# Patient Record
Sex: MALE | Race: WHITE | NOT HISPANIC OR LATINO | Employment: FULL TIME | URBAN - METROPOLITAN AREA
[De-identification: names, ages, dates, MRNs, and addresses within clinical notes are randomized per-mention and may not be internally consistent; named-entity substitution may affect disease eponyms.]

---

## 2017-12-28 ENCOUNTER — ALLSCRIPTS OFFICE VISIT (OUTPATIENT)
Dept: OTHER | Facility: OTHER | Age: 42
End: 2017-12-28

## 2017-12-28 LAB — S PYO AG THROAT QL: NEGATIVE

## 2018-01-22 VITALS
DIASTOLIC BLOOD PRESSURE: 70 MMHG | HEART RATE: 72 BPM | RESPIRATION RATE: 14 BRPM | WEIGHT: 187 LBS | HEIGHT: 69 IN | BODY MASS INDEX: 27.7 KG/M2 | SYSTOLIC BLOOD PRESSURE: 140 MMHG | TEMPERATURE: 100.4 F

## 2019-01-03 ENCOUNTER — OFFICE VISIT (OUTPATIENT)
Dept: FAMILY MEDICINE CLINIC | Facility: CLINIC | Age: 44
End: 2019-01-03
Payer: COMMERCIAL

## 2019-01-03 VITALS
DIASTOLIC BLOOD PRESSURE: 82 MMHG | HEART RATE: 70 BPM | HEIGHT: 70 IN | RESPIRATION RATE: 12 BRPM | SYSTOLIC BLOOD PRESSURE: 148 MMHG | BODY MASS INDEX: 29.35 KG/M2 | TEMPERATURE: 98.9 F | WEIGHT: 205 LBS

## 2019-01-03 DIAGNOSIS — B02.9 HERPES ZOSTER WITHOUT COMPLICATION: Primary | ICD-10-CM

## 2019-01-03 PROCEDURE — 99213 OFFICE O/P EST LOW 20 MIN: CPT | Performed by: NURSE PRACTITIONER

## 2019-01-03 PROCEDURE — 3008F BODY MASS INDEX DOCD: CPT | Performed by: NURSE PRACTITIONER

## 2019-01-03 NOTE — PROGRESS NOTES
Chief Complaint   Patient presents with    Rash        Patient ID: Davy Thomas is a 41 y o  male  Patient reports noticing some pain along the thoracic spine area on the left side about mid thoracic the started over the weekend  He did do a lot of work chopping wood on Friday and thought maybe he strained a muscle  As time went on he noticed a rash erupting in the area  He states he is concerned he might have shingles  Patient states he did have chickenpox as a child  Treated with calamine lotion at home without improvement  He states it seems that the rash stopped spreading as of yesterday  Patient states there is only mild discomfort in the area of the rash  He states since onset the pain has actually lessened  No past medical history on file  No past surgical history on file  There is no problem list on file for this patient  Family History   Problem Relation Age of Onset    No Known Problems Mother     No Known Problems Father        There is no immunization history for the selected administration types on file for this patient  No Known Allergies    No current outpatient prescriptions on file  No current facility-administered medications for this visit  Social History     Social History    Marital status: /Civil Union     Spouse name: N/A    Number of children: N/A    Years of education: N/A     Social History Main Topics    Smoking status: Never Smoker    Smokeless tobacco: Never Used    Alcohol use None      Comment: Occasional     Drug use: No    Sexual activity: Not Asked     Other Topics Concern    None     Social History Narrative    Cigar smoker    Daily caffeinated coffee consumption        Review of Systems   Skin: Positive for rash           Objective:    /82 (BP Location: Left arm, Patient Position: Sitting, Cuff Size: Extra-Large)   Pulse 70   Temp 98 9 °F (37 2 °C) (Temporal)   Resp 12   Ht 5' 10" (1 778 m)   Wt 93 kg (205 lb) BMI 29 41 kg/m²        Physical Exam   Skin:   There is a erythematous vesicular rash that extends from the center of about the eighth thoracic level around that common dermatome just below the nipple to the mid of the sternum  Assessment/Plan:    No problem-specific Assessment & Plan notes found for this encounter  Diagnoses and all orders for this visit:    Herpes zoster without complication  Comments:  No current issue with uncontrolled neurologist   Erling Plume to keep the rash covered until it tries completely  Return to the office if having neurologist symptom        Recommend over-the-counter ibuprofen as needed  Return if having any post herpetic neuralgia pain  There are no Patient Instructions on file for this visit                    Joanna Huber

## 2020-08-09 ENCOUNTER — OFFICE VISIT (OUTPATIENT)
Dept: URGENT CARE | Facility: CLINIC | Age: 45
End: 2020-08-09
Payer: COMMERCIAL

## 2020-08-09 VITALS
SYSTOLIC BLOOD PRESSURE: 160 MMHG | DIASTOLIC BLOOD PRESSURE: 108 MMHG | RESPIRATION RATE: 18 BRPM | HEIGHT: 69 IN | BODY MASS INDEX: 33.62 KG/M2 | WEIGHT: 227 LBS | OXYGEN SATURATION: 99 % | TEMPERATURE: 99.7 F | HEART RATE: 105 BPM

## 2020-08-09 DIAGNOSIS — S61.219A LACERATION WITHOUT FOREIGN BODY OF UNSPECIFIED FINGER WITHOUT DAMAGE TO NAIL, INITIAL ENCOUNTER: Primary | ICD-10-CM

## 2020-08-09 PROCEDURE — 99213 OFFICE O/P EST LOW 20 MIN: CPT | Performed by: NURSE PRACTITIONER

## 2020-08-09 PROCEDURE — 3008F BODY MASS INDEX DOCD: CPT | Performed by: NURSE PRACTITIONER

## 2020-08-09 PROCEDURE — 12020 TX SUPFC WND DEHSN SMPL CLSR: CPT | Performed by: NURSE PRACTITIONER

## 2020-08-09 PROCEDURE — 12001 RPR S/N/AX/GEN/TRNK 2.5CM/<: CPT | Performed by: NURSE PRACTITIONER

## 2020-08-09 PROCEDURE — 1036F TOBACCO NON-USER: CPT | Performed by: NURSE PRACTITIONER

## 2020-08-09 RX ORDER — MULTIVITAMIN
1 CAPSULE ORAL DAILY
COMMUNITY

## 2020-08-09 RX ORDER — OMEGA-3 FATTY ACIDS CAP DELAYED RELEASE 1000 MG 1000 MG
CAPSULE DELAYED RELEASE ORAL DAILY
COMMUNITY

## 2020-08-09 RX ORDER — MAG HYDROX/ALUMINUM HYD/SIMETH 400-400-40
SUSPENSION, ORAL (FINAL DOSE FORM) ORAL DAILY
COMMUNITY

## 2020-08-09 NOTE — PATIENT INSTRUCTIONS
Keep area wrapped when out side  Open to air when at home  Tylenol/Ibuprofen as needed  Wear splint for protection  Practice gentle range of motion with finger  Monitor for signs of infection  Suture removal in 7-10 days

## 2022-10-07 ENCOUNTER — OFFICE VISIT (OUTPATIENT)
Dept: URGENT CARE | Facility: CLINIC | Age: 47
End: 2022-10-07
Payer: COMMERCIAL

## 2022-10-07 VITALS
WEIGHT: 236 LBS | HEIGHT: 70 IN | SYSTOLIC BLOOD PRESSURE: 160 MMHG | OXYGEN SATURATION: 99 % | HEART RATE: 118 BPM | BODY MASS INDEX: 33.79 KG/M2 | DIASTOLIC BLOOD PRESSURE: 100 MMHG | RESPIRATION RATE: 18 BRPM | TEMPERATURE: 97.3 F

## 2022-10-07 DIAGNOSIS — R42 DIZZINESS AND GIDDINESS: Primary | ICD-10-CM

## 2022-10-07 PROCEDURE — 99213 OFFICE O/P EST LOW 20 MIN: CPT | Performed by: PHYSICIAN ASSISTANT

## 2022-10-07 NOTE — PROGRESS NOTES
[facility]        NAME: Javi Sherman is a 42 y o  male  : 1975    MRN: [id number]  DATE: 2022  TIME: 11:28 AM    Assessment and Plan   Dizziness and giddiness [R42]  1  Dizziness and giddiness  Ambulatory Referral to Physical Therapy         Patient Instructions   Dizziness:   -Patient has nystagmus when he lays flat and with positional changes  [place]-Hallpike maneuver was attempted  We discussed that this is likely BPPV  He did have a L sided cerumen impaction which was cleared with irrigation  Otherwise Neuro exam and cardiac exam were normal  Mildly elevated HR and BP likely due to anxiety  Stay well hydrated  Rest  Follow up with ENT vs PT for possible treatment of BPPV  If you experience persistent or worsening symptoms go to the ED immediately  This was discussed in depth  Follow up with PCP in 3-5 days  Proceed to  ER if symptoms worsen  Chief Complaint     Chief Complaint   Patient presents with    Dizziness     Pt reports of dizziness started last night after laying down  Pt denies any CP or SOB  No facial droop seen  Pt reports of tingling on the right hand  History of Present Illness       The patient is a 44-year-old male who presents today for dizziness that began last night while lying in bed  He states that he rolled over in bed and felt the sensation that the room was spinning  He states that he was able to fall back asleep  He woke up this morning with continued to experience dizziness  He states that the dizziness would last 2-5 seconds and was precipitated by movement  He denies fever, chills, body aches  No sycnope or LOC  No chest pain, palpitations, dyspnea, chest tightness  No nausea, vomiting, abdominal pain  No blood in his stool  No headache, visual changes, photophobia  No facial drooping, change in gait or mentation  He states that his son was ill with COVID last week  His wife and daughter are ill with URI sx  He denies URI sx  Review of Systems   Review of Systems   Constitutional: Negative for activity change, appetite change, chills, diaphoresis, fatigue and fever  HENT: Negative for congestion, ear discharge, ear pain, facial swelling, hearing loss, postnasal drip, rhinorrhea, sinus pressure, sinus pain, sore throat, tinnitus, trouble swallowing and voice change  Eyes: Negative for photophobia and visual disturbance  Respiratory: Negative for apnea, cough, chest tightness, shortness of breath, wheezing and stridor  Cardiovascular: Negative for chest pain, palpitations and leg swelling  Gastrointestinal: Negative for abdominal distention and abdominal pain  Genitourinary: Negative for decreased urine volume  Musculoskeletal: Negative for arthralgias, joint swelling, myalgias, neck pain and neck stiffness  Skin: Negative for rash  Allergic/Immunologic: Negative for immunocompromised state  Neurological: Positive for dizziness  Negative for weakness, light-headedness, numbness and headaches  Hematological: Negative for adenopathy           Current Medications       Current Outpatient Medications:     Cholecalciferol (Vitamin D3) 125 MCG (5000 UT) CAPS, Take by mouth daily, Disp: , Rfl:     Multiple Vitamin (multivitamin) capsule, Take 1 capsule by mouth daily, Disp: , Rfl:     Omega-3 Fatty Acids (Fish Oil) 1000 MG CPDR, Take by mouth daily, Disp: , Rfl:     Current Allergies     Allergies as of 10/07/2022    (No Known Allergies)            The following portions of the patient's history were reviewed and updated as appropriate: allergies, current medications, past family history, past medical history, past social history, past surgical history and problem list      Past Medical History:   Diagnosis Date    Allergic rhinitis     Pneumonia        Past Surgical History:   Procedure Laterality Date    NO PAST SURGERIES         Family History   Problem Relation Age of Onset    No Known Problems Mother    Kathy Cao No Known Problems Father          Medications have been verified  Objective   /100   Pulse (!) 118   Temp (!) 97 3 °F (36 3 °C)   Resp 18   Ht 5' 10" (1 778 m)   Wt 107 kg (236 lb)   SpO2 99%   BMI 33 86 kg/m²   No LMP for male patient  Physical Exam     Physical Exam  Vitals and nursing note reviewed  Constitutional:       General: He is not in acute distress  Appearance: He is well-developed  He is not diaphoretic  HENT:      Head: Normocephalic and atraumatic  Right Ear: Hearing, tympanic membrane, ear canal and external ear normal       Left Ear: Hearing, tympanic membrane, ear canal and external ear normal       Nose: Nose normal  No mucosal edema or rhinorrhea  Right Sinus: No maxillary sinus tenderness or frontal sinus tenderness  Left Sinus: No maxillary sinus tenderness or frontal sinus tenderness  Mouth/Throat:      Pharynx: Uvula midline  No oropharyngeal exudate, posterior oropharyngeal erythema or uvula swelling  Tonsils: No tonsillar abscesses  Cardiovascular:      Rate and Rhythm: Normal rate and regular rhythm  Heart sounds: S1 normal and S2 normal  Heart sounds not distant  No murmur heard  No friction rub  No gallop  No S3 or S4 sounds  Pulmonary:      Effort: No tachypnea, bradypnea, accessory muscle usage or respiratory distress  Breath sounds: No decreased breath sounds, wheezing, rhonchi or rales  Musculoskeletal:      Cervical back: Normal range of motion and neck supple  Lymphadenopathy:      Cervical: No cervical adenopathy  Neurological:      General: No focal deficit present  Mental Status: He is alert and oriented to person, place, and time  Cranial Nerves: Cranial nerves are intact  No facial asymmetry  Sensory: Sensation is intact  Motor: Motor function is intact  No pronator drift  Coordination: Coordination is intact  Romberg sign negative   Finger-Nose-Finger Test normal  Gait: Gait is intact  Gait normal       Deep Tendon Reflexes: Reflexes are normal and symmetric     Psychiatric:         Behavior: Behavior normal

## 2022-10-07 NOTE — PATIENT INSTRUCTIONS
Dizziness   WHAT YOU NEED TO KNOW:   Dizziness is a feeling of being off balance or unsteady  Common causes of dizziness are an inner ear fluid imbalance or a lack of oxygen in your blood  Dizziness may be acute (lasts 3 days or less) or chronic (lasts longer than 3 days)  You may have dizzy spells that last from seconds to a few hours  DISCHARGE INSTRUCTIONS:   Return to the emergency department if:   You have a headache and a stiff neck  You have shaking chills and a fever  You vomit over and over with no relief  Your vomit or bowel movements are red or black  You have pain in your chest, back, or abdomen  You have numbness, especially in your face, arms, or legs  You have trouble moving your arms or legs  You are confused  Contact your healthcare provider if:   You have a fever  Your symptoms do not get better with treatment  You have questions or concerns about your condition or care  Manage your symptoms:   Do not drive  or operate heavy machinery when you are dizzy  Get up slowly  from sitting or lying down  Drink plenty of liquids  Liquids help prevent dehydration  Ask how much liquid to drink each day and which liquids are best for you  Follow up with your doctor as directed:  Write down your questions so you remember to ask them during your visits  © Copyright [employer] 2022 Information is for End User's use only and may not be sold, redistributed or otherwise used for commercial purposes  All illustrations and images included in CareNotes® are the copyrighted property of [employer] Inc  or Kay Campo   The above information is an  only  It is not intended as medical advice for individual conditions or treatments  Talk to your doctor, nurse or pharmacist before following any medical regimen to see if it is safe and effective for you      Benign Paroxysmal Positional Vertigo   WHAT YOU NEED TO KNOW:   BPPV is an inner ear condition that causes you to suddenly feel dizzy  Benign means it is not serious or life-threatening  BPPV is caused by a problem with the nerves and structure of your inner ear  BPPV happens when small pieces of calcium break loose and lump together in one of your inner ear canals  DISCHARGE INSTRUCTIONS:   Return to the emergency department if:   You fall during a BPPV episode and are injured  You have a severe headache that does not go away  You have new changes in your vision or feel weak or confused  You have problems hearing, or you have ringing or buzzing in your ears  Contact your healthcare provider if:   Your BPPV symptoms do not go away or they return  You have problems with your balance, or you are falling often  You have new or increased nausea or vomiting with vertigo  You feel anxious or depressed and do not want to leave your home  You have questions or concerns about your condition or care  Medicines:   Medicines  may be recommended or prescribed to treat dizziness or nausea  Take your medicine as directed  Contact your healthcare provider if you think your medicine is not helping or if you have side effects  Tell him of her if you are allergic to any medicine  Keep a list of the medicines, vitamins, and herbs you take  Include the amounts, and when and why you take them  Bring the list or the pill bottles to follow-up visits  Carry your medicine list with you in case of an emergency  Prevent your symptoms:   Try to avoid sudden head movements  Stand up and lie down slowly  Raise and support your head when you lie down  Place pillows under your upper back and head or rest in a recliner  Change your position often when you are lying down  Try not to lie with your head on the same side for long periods of time  Roll over slowly  Wear protective gear  when you ride a bike or play sports  A helmet helps protect your head from injury      Follow up with your healthcare provider as directed: You may need to return in 1 month to check the progress of your treatment  Write down your questions so you remember to ask them during your visits  © Copyright [employer] 2022 Information is for End User's use only and may not be sold, redistributed or otherwise used for commercial purposes  All illustrations and images included in CareNotes® are the copyrighted property of [employer] Inc  or [facility] Mallory Campo   The above information is an  only  It is not intended as medical advice for individual conditions or treatments  Talk to your doctor, nurse or pharmacist before following any medical regimen to see if it is safe and effective for you  Dizziness:   -Patient has nystagmus when he lays flat and with positional changes  [place]-Hallpike maneuver was attempted  We discussed that this is likely BPPV  He did have a L sided cerumen impaction which was cleared with irrigation  Otherwise Neuro exam and cardiac exam were normal  Mildly elevated HR and BP likely due to anxiety  Stay well hydrated  Rest  Follow up with ENT vs PT for possible treatment of BPPV  If you experience persistent or worsening symptoms go to the ED immediately  This was discussed in depth